# Patient Record
Sex: FEMALE | Race: WHITE | NOT HISPANIC OR LATINO | Employment: FULL TIME | URBAN - NONMETROPOLITAN AREA
[De-identification: names, ages, dates, MRNs, and addresses within clinical notes are randomized per-mention and may not be internally consistent; named-entity substitution may affect disease eponyms.]

---

## 2020-02-04 ENCOUNTER — OFFICE VISIT (OUTPATIENT)
Dept: GASTROENTEROLOGY | Facility: CLINIC | Age: 50
End: 2020-02-04

## 2020-02-04 VITALS
DIASTOLIC BLOOD PRESSURE: 70 MMHG | SYSTOLIC BLOOD PRESSURE: 120 MMHG | WEIGHT: 147 LBS | OXYGEN SATURATION: 99 % | HEART RATE: 79 BPM | TEMPERATURE: 97.3 F

## 2020-02-04 DIAGNOSIS — K62.5 RECTAL BLEED: Primary | ICD-10-CM

## 2020-02-04 DIAGNOSIS — K21.9 GASTROESOPHAGEAL REFLUX DISEASE, ESOPHAGITIS PRESENCE NOT SPECIFIED: ICD-10-CM

## 2020-02-04 PROCEDURE — 99204 OFFICE O/P NEW MOD 45 MIN: CPT | Performed by: NURSE PRACTITIONER

## 2020-02-04 RX ORDER — ALPRAZOLAM 0.25 MG/1
TABLET ORAL
COMMUNITY
Start: 2020-02-03

## 2020-02-04 RX ORDER — RIZATRIPTAN BENZOATE 10 MG/1
TABLET, ORALLY DISINTEGRATING ORAL
COMMUNITY
Start: 2019-11-20

## 2020-02-04 RX ORDER — FLUCONAZOLE 150 MG/1
150 TABLET ORAL DAILY
Status: ON HOLD | COMMUNITY
Start: 2020-01-29 | End: 2020-02-14

## 2020-02-04 RX ORDER — SUCRALFATE 1 G/1
TABLET ORAL
COMMUNITY
Start: 2019-12-05

## 2020-02-04 RX ORDER — ERENUMAB-AOOE 70 MG/ML
INJECTION SUBCUTANEOUS
COMMUNITY
Start: 2020-01-22

## 2020-02-04 RX ORDER — CYANOCOBALAMIN 1000 UG/ML
INJECTION, SOLUTION INTRAMUSCULAR; SUBCUTANEOUS
COMMUNITY
Start: 2020-01-05

## 2020-02-04 RX ORDER — BUTALBITAL, ACETAMINOPHEN AND CAFFEINE 300; 40; 50 MG/1; MG/1; MG/1
CAPSULE ORAL
COMMUNITY
Start: 2020-02-03

## 2020-02-04 RX ORDER — NYSTATIN 100000 U/G
CREAM TOPICAL 2 TIMES DAILY
Status: ON HOLD | COMMUNITY
Start: 2019-11-01 | End: 2020-02-14

## 2020-02-04 NOTE — PROGRESS NOTES
"Chief Complaint   Patient presents with   • GI Problem     throwing up burning in throat gastritis per dr. mercy cope        PCP: Jean Crocker MD  REFER: Jean Crocker MD    Subjective     HPI    Patient presents to office with complaints of  Complain of \"naggin\" upper abdominal pain with radiation to LUQ.  Increased difficulty with indigestion over past month.  She has developed foul taste in her mouth.  She frequently has sore throat.  Recurrent nausea with emesis.  She has taken OTC Nexium since 3/2018.  No dysphagia.     Intermittent Rectal pain.  feces passing through rectum exacerbates pain.  Bowels were moving regular, soft daily.  now stool is hard, large, sometimes observes bright red blood, greasy film in toilet bowel after BM.  Change in bowel habit started after episode of severe constipation  4 weeks ago.  Constipation started after using gaviscon.    CScope (Dr Wu) 8/2015-rectal polyp   Endoscopy (Dr Wu) 2015-normal     History reviewed. No pertinent past medical history.    Past Surgical History:   Procedure Laterality Date   • CHOLECYSTECTOMY     • COLONOSCOPY  08/17/2015    a small rectal polyp removed and obliterat   • ENDOSCOPY  08/17/2015    normal   • GASTRIC BANDING     • HYSTERECTOMY         Outpatient Medications Marked as Taking for the 2/4/20 encounter (Office Visit) with Adren Yo APRN   Medication Sig Dispense Refill   • AIMOVIG 70 MG/ML prefilled syringe AS DIRECTED INJECT 70MG ML SUBCUTANEOUS ONCE MONTHLY     • ALPRAZolam (XANAX) 0.25 MG tablet      • butalbital-acetaminophen-caffeine (ORBIVAN) -40 MG capsule capsule      • cyanocobalamin 1000 MCG/ML injection [id number] (1000MCG) AS DIRECTED ONCE A WEEK     • Esomeprazole Magnesium (NEXIUM 24HR PO) Take  by mouth.     • fluconazole (DIFLUCAN) 150 MG tablet Take 150 mg by mouth Daily.     • nystatin (MYCOSTATIN) 616391 UNIT/GM cream Apply  topically to the appropriate area as directed 2 " (Two) Times a Day.     • rizatriptan MLT (MAXALT-MLT) 10 MG disintegrating tablet TAKE 1 TABLET BY MOUTH AT ONSET OF MIGRAINE AND MAY REPEAT IN 2 HOURS AS NEEDED     • sucralfate (CARAFATE) 1 g tablet TAKE 1 TABLET BY MOUTH 4 TIMES DAILY BEFORE MEAL(S) AND AT BEDTIME         No Known Allergies    Social History     Socioeconomic History   • Marital status:      Spouse name: Not on file   • Number of children: Not on file   • Years of education: Not on file   • Highest education level: Not on file   Tobacco Use   • Smoking status: Never Smoker   • Smokeless tobacco: Never Used   Substance and Sexual Activity   • Alcohol use: Never     Frequency: Never   • Drug use: Never       Family History   Problem Relation Age of Onset   • Colon cancer Neg Hx    • Esophageal cancer Neg Hx        Review of Systems   Constitutional: Negative for fatigue, fever and unexpected weight change.   HENT: Negative for hearing loss, sore throat and voice change.    Eyes: Negative for visual disturbance.   Respiratory: Negative for cough, shortness of breath and wheezing.    Cardiovascular: Negative for chest pain and palpitations.   Gastrointestinal: Positive for anal bleeding, constipation, nausea and rectal pain. Negative for abdominal pain, blood in stool and vomiting.   Endocrine: Negative for polydipsia and polyuria.   Genitourinary: Negative for difficulty urinating, dysuria, hematuria and urgency.   Musculoskeletal: Negative for joint swelling and myalgias.   Skin: Negative for color change, rash and wound.   Neurological: Negative for dizziness, tremors, seizures and syncope.   Hematological: Does not bruise/bleed easily.   Psychiatric/Behavioral: Negative for agitation and confusion. The patient is not nervous/anxious.        Objective     Vitals:    02/04/20 1323   BP: 120/70   Pulse: 79   Temp: 97.3 °F (36.3 °C)   SpO2: 99%   Weight: 66.7 kg (147 lb)     There is no height or weight on file to calculate BMI.    Physical  Exam   Constitutional: She is oriented to person, place, and time. She appears well-developed and well-nourished. She is cooperative.   HENT:   Head: Normocephalic and atraumatic.   Eyes: Pupils are equal, round, and reactive to light. Conjunctivae are normal. No scleral icterus.   Neck: Normal range of motion. Neck supple. No JVD present. No thyroid mass and no thyromegaly present.   Cardiovascular: Normal rate, regular rhythm and normal heart sounds. Exam reveals no gallop and no friction rub.   No murmur heard.  Pulmonary/Chest: Effort normal and breath sounds normal. No accessory muscle usage. No respiratory distress. She has no wheezes. She has no rales.   Abdominal: Soft. Normal appearance and bowel sounds are normal. She exhibits no distension, no ascites and no mass. There is no hepatosplenomegaly. There is no tenderness. There is no rebound and no guarding.   Musculoskeletal: Normal range of motion. She exhibits no edema or tenderness.     Vascular Status -  Her right foot exhibits normal foot vasculature  and no edema. Her left foot exhibits normal foot vasculature  and no edema.  Lymphadenopathy:     She has no cervical adenopathy.   Neurological: She is alert and oriented to person, place, and time. She has normal strength. Gait normal.   Skin: Skin is warm, dry and intact. No rash noted.       Imaging Results (Most Recent)     None          There is no height or weight on file to calculate BMI.    Assessment/Plan     Rehan was seen today for gi problem.    Diagnoses and all orders for this visit:    Rectal bleed  -     Case Request; Standing  -     Case Request    Gastroesophageal reflux disease, esophagitis presence not specified  -     Case Request; Standing  -     Case Request        ESOPHAGOGASTRODUODENOSCOPY WITH ANESTHESIA (N/A)   2. COLONOSCOPY WITH ANESTHESIA    Take PPI 30 min prior to breakfast   Decrease caffeine, nicotine, etoh- all contribute to acid reflux  Do not eat 2-3 hours within  lying down, elevated HOB 4-6 inches    Recommend daily use of Miralax, adjust as needed    The risk of the endoscopy were discussed in detail.  We discussed the risk of perforation (one out of 1216-5626, riskier with dilation), bleeding (one out of 500), and the rare risks of infection, adverse reaction to anesthesia, respiratory failure, cardiac failure including MI and adverse reaction to medications, etc.  We discussed consequences that could occur if a risk were to develop such as the need for hospitalization, blood transfusion, surgical intervention, medications, pain and disability and death.  Alternatives include not doing anything, or pursuing an UGI series which only offers a diagnosis with potential less accuracy compared to egd.  The patient verbalizes understanding and agrees to proceed.      All risks, benefits, alternatives, and indications of colonoscopy procedure have been discussed with the patient. Risks to include perforation of the colon requiring possible surgery or colostomy, risk of bleeding from biopsies or removal of colon tissue, possibility of missing a colon polyp or cancer, or adverse drug reaction.  Benefits to include the diagnosis and management of disease of the colon and rectum. Alternatives to include barium enema, radiographic evaluation, lab testing or no intervention. Pt verbalizes understanding and agrees to proceed with procedure.        Patient's There is no height or weight on file to calculate BMI. BMI is above normal parameters. Recommendations include: no follow up.      There are no Patient Instructions on file for this visit.

## 2020-02-04 NOTE — H&P (VIEW-ONLY)
"Chief Complaint   Patient presents with   • GI Problem     throwing up burning in throat gastritis per dr. mercy cope        PCP: Jean Crocker MD  REFER: Jean Crocker MD    Subjective     HPI    Patient presents to office with complaints of  Complain of \"naggin\" upper abdominal pain with radiation to LUQ.  Increased difficulty with indigestion over past month.  She has developed foul taste in her mouth.  She frequently has sore throat.  Recurrent nausea with emesis.  She has taken OTC Nexium since 3/2018.  No dysphagia.     Intermittent Rectal pain.  feces passing through rectum exacerbates pain.  Bowels were moving regular, soft daily.  now stool is hard, large, sometimes observes bright red blood, greasy film in toilet bowel after BM.  Change in bowel habit started after episode of severe constipation  4 weeks ago.  Constipation started after using gaviscon.    CScope (Dr Wu) 8/2015-rectal polyp   Endoscopy (Dr Wu) 2015-normal     History reviewed. No pertinent past medical history.    Past Surgical History:   Procedure Laterality Date   • CHOLECYSTECTOMY     • COLONOSCOPY  08/17/2015    a small rectal polyp removed and obliterat   • ENDOSCOPY  08/17/2015    normal   • GASTRIC BANDING     • HYSTERECTOMY         Outpatient Medications Marked as Taking for the 2/4/20 encounter (Office Visit) with Arden Yo APRN   Medication Sig Dispense Refill   • AIMOVIG 70 MG/ML prefilled syringe AS DIRECTED INJECT 70MG ML SUBCUTANEOUS ONCE MONTHLY     • ALPRAZolam (XANAX) 0.25 MG tablet      • butalbital-acetaminophen-caffeine (ORBIVAN) -40 MG capsule capsule      • cyanocobalamin 1000 MCG/ML injection [id number] (1000MCG) AS DIRECTED ONCE A WEEK     • Esomeprazole Magnesium (NEXIUM 24HR PO) Take  by mouth.     • fluconazole (DIFLUCAN) 150 MG tablet Take 150 mg by mouth Daily.     • nystatin (MYCOSTATIN) 341313 UNIT/GM cream Apply  topically to the appropriate area as directed 2 " (Two) Times a Day.     • rizatriptan MLT (MAXALT-MLT) 10 MG disintegrating tablet TAKE 1 TABLET BY MOUTH AT ONSET OF MIGRAINE AND MAY REPEAT IN 2 HOURS AS NEEDED     • sucralfate (CARAFATE) 1 g tablet TAKE 1 TABLET BY MOUTH 4 TIMES DAILY BEFORE MEAL(S) AND AT BEDTIME         No Known Allergies    Social History     Socioeconomic History   • Marital status:      Spouse name: Not on file   • Number of children: Not on file   • Years of education: Not on file   • Highest education level: Not on file   Tobacco Use   • Smoking status: Never Smoker   • Smokeless tobacco: Never Used   Substance and Sexual Activity   • Alcohol use: Never     Frequency: Never   • Drug use: Never       Family History   Problem Relation Age of Onset   • Colon cancer Neg Hx    • Esophageal cancer Neg Hx        Review of Systems   Constitutional: Negative for fatigue, fever and unexpected weight change.   HENT: Negative for hearing loss, sore throat and voice change.    Eyes: Negative for visual disturbance.   Respiratory: Negative for cough, shortness of breath and wheezing.    Cardiovascular: Negative for chest pain and palpitations.   Gastrointestinal: Positive for anal bleeding, constipation, nausea and rectal pain. Negative for abdominal pain, blood in stool and vomiting.   Endocrine: Negative for polydipsia and polyuria.   Genitourinary: Negative for difficulty urinating, dysuria, hematuria and urgency.   Musculoskeletal: Negative for joint swelling and myalgias.   Skin: Negative for color change, rash and wound.   Neurological: Negative for dizziness, tremors, seizures and syncope.   Hematological: Does not bruise/bleed easily.   Psychiatric/Behavioral: Negative for agitation and confusion. The patient is not nervous/anxious.        Objective     Vitals:    02/04/20 1323   BP: 120/70   Pulse: 79   Temp: 97.3 °F (36.3 °C)   SpO2: 99%   Weight: 66.7 kg (147 lb)     There is no height or weight on file to calculate BMI.    Physical  Exam   Constitutional: She is oriented to person, place, and time. She appears well-developed and well-nourished. She is cooperative.   HENT:   Head: Normocephalic and atraumatic.   Eyes: Pupils are equal, round, and reactive to light. Conjunctivae are normal. No scleral icterus.   Neck: Normal range of motion. Neck supple. No JVD present. No thyroid mass and no thyromegaly present.   Cardiovascular: Normal rate, regular rhythm and normal heart sounds. Exam reveals no gallop and no friction rub.   No murmur heard.  Pulmonary/Chest: Effort normal and breath sounds normal. No accessory muscle usage. No respiratory distress. She has no wheezes. She has no rales.   Abdominal: Soft. Normal appearance and bowel sounds are normal. She exhibits no distension, no ascites and no mass. There is no hepatosplenomegaly. There is no tenderness. There is no rebound and no guarding.   Musculoskeletal: Normal range of motion. She exhibits no edema or tenderness.     Vascular Status -  Her right foot exhibits normal foot vasculature  and no edema. Her left foot exhibits normal foot vasculature  and no edema.  Lymphadenopathy:     She has no cervical adenopathy.   Neurological: She is alert and oriented to person, place, and time. She has normal strength. Gait normal.   Skin: Skin is warm, dry and intact. No rash noted.       Imaging Results (Most Recent)     None          There is no height or weight on file to calculate BMI.    Assessment/Plan     Rehan was seen today for gi problem.    Diagnoses and all orders for this visit:    Rectal bleed  -     Case Request; Standing  -     Case Request    Gastroesophageal reflux disease, esophagitis presence not specified  -     Case Request; Standing  -     Case Request        ESOPHAGOGASTRODUODENOSCOPY WITH ANESTHESIA (N/A)   2. COLONOSCOPY WITH ANESTHESIA    Take PPI 30 min prior to breakfast   Decrease caffeine, nicotine, etoh- all contribute to acid reflux  Do not eat 2-3 hours within  lying down, elevated HOB 4-6 inches    Recommend daily use of Miralax, adjust as needed    The risk of the endoscopy were discussed in detail.  We discussed the risk of perforation (one out of 8020-6549, riskier with dilation), bleeding (one out of 500), and the rare risks of infection, adverse reaction to anesthesia, respiratory failure, cardiac failure including MI and adverse reaction to medications, etc.  We discussed consequences that could occur if a risk were to develop such as the need for hospitalization, blood transfusion, surgical intervention, medications, pain and disability and death.  Alternatives include not doing anything, or pursuing an UGI series which only offers a diagnosis with potential less accuracy compared to egd.  The patient verbalizes understanding and agrees to proceed.      All risks, benefits, alternatives, and indications of colonoscopy procedure have been discussed with the patient. Risks to include perforation of the colon requiring possible surgery or colostomy, risk of bleeding from biopsies or removal of colon tissue, possibility of missing a colon polyp or cancer, or adverse drug reaction.  Benefits to include the diagnosis and management of disease of the colon and rectum. Alternatives to include barium enema, radiographic evaluation, lab testing or no intervention. Pt verbalizes understanding and agrees to proceed with procedure.        Patient's There is no height or weight on file to calculate BMI. BMI is above normal parameters. Recommendations include: no follow up.      There are no Patient Instructions on file for this visit.

## 2020-02-04 NOTE — H&P (VIEW-ONLY)
"Chief Complaint   Patient presents with   • GI Problem     throwing up burning in throat gastritis per dr. mercy cope        PCP: Jean Crocker MD  REFER: Jean Crocker MD    Subjective     HPI    Patient presents to office with complaints of  Complain of \"naggin\" upper abdominal pain with radiation to LUQ.  Increased difficulty with indigestion over past month.  She has developed foul taste in her mouth.  She frequently has sore throat.  Recurrent nausea with emesis.  She has taken OTC Nexium since 3/2018.  No dysphagia.     Intermittent Rectal pain.  feces passing through rectum exacerbates pain.  Bowels were moving regular, soft daily.  now stool is hard, large, sometimes observes bright red blood, greasy film in toilet bowel after BM.  Change in bowel habit started after episode of severe constipation  4 weeks ago.  Constipation started after using gaviscon.    CScope (Dr Wu) 8/2015-rectal polyp   Endoscopy (Dr Wu) 2015-normal     History reviewed. No pertinent past medical history.    Past Surgical History:   Procedure Laterality Date   • CHOLECYSTECTOMY     • COLONOSCOPY  08/17/2015    a small rectal polyp removed and obliterat   • ENDOSCOPY  08/17/2015    normal   • GASTRIC BANDING     • HYSTERECTOMY         Outpatient Medications Marked as Taking for the 2/4/20 encounter (Office Visit) with Arden Yo APRN   Medication Sig Dispense Refill   • AIMOVIG 70 MG/ML prefilled syringe AS DIRECTED INJECT 70MG ML SUBCUTANEOUS ONCE MONTHLY     • ALPRAZolam (XANAX) 0.25 MG tablet      • butalbital-acetaminophen-caffeine (ORBIVAN) -40 MG capsule capsule      • cyanocobalamin 1000 MCG/ML injection [id number] (1000MCG) AS DIRECTED ONCE A WEEK     • Esomeprazole Magnesium (NEXIUM 24HR PO) Take  by mouth.     • fluconazole (DIFLUCAN) 150 MG tablet Take 150 mg by mouth Daily.     • nystatin (MYCOSTATIN) 421234 UNIT/GM cream Apply  topically to the appropriate area as directed 2 " (Two) Times a Day.     • rizatriptan MLT (MAXALT-MLT) 10 MG disintegrating tablet TAKE 1 TABLET BY MOUTH AT ONSET OF MIGRAINE AND MAY REPEAT IN 2 HOURS AS NEEDED     • sucralfate (CARAFATE) 1 g tablet TAKE 1 TABLET BY MOUTH 4 TIMES DAILY BEFORE MEAL(S) AND AT BEDTIME         No Known Allergies    Social History     Socioeconomic History   • Marital status:      Spouse name: Not on file   • Number of children: Not on file   • Years of education: Not on file   • Highest education level: Not on file   Tobacco Use   • Smoking status: Never Smoker   • Smokeless tobacco: Never Used   Substance and Sexual Activity   • Alcohol use: Never     Frequency: Never   • Drug use: Never       Family History   Problem Relation Age of Onset   • Colon cancer Neg Hx    • Esophageal cancer Neg Hx        Review of Systems   Constitutional: Negative for fatigue, fever and unexpected weight change.   HENT: Negative for hearing loss, sore throat and voice change.    Eyes: Negative for visual disturbance.   Respiratory: Negative for cough, shortness of breath and wheezing.    Cardiovascular: Negative for chest pain and palpitations.   Gastrointestinal: Positive for anal bleeding, constipation, nausea and rectal pain. Negative for abdominal pain, blood in stool and vomiting.   Endocrine: Negative for polydipsia and polyuria.   Genitourinary: Negative for difficulty urinating, dysuria, hematuria and urgency.   Musculoskeletal: Negative for joint swelling and myalgias.   Skin: Negative for color change, rash and wound.   Neurological: Negative for dizziness, tremors, seizures and syncope.   Hematological: Does not bruise/bleed easily.   Psychiatric/Behavioral: Negative for agitation and confusion. The patient is not nervous/anxious.        Objective     Vitals:    02/04/20 1323   BP: 120/70   Pulse: 79   Temp: 97.3 °F (36.3 °C)   SpO2: 99%   Weight: 66.7 kg (147 lb)     There is no height or weight on file to calculate BMI.    Physical  Exam   Constitutional: She is oriented to person, place, and time. She appears well-developed and well-nourished. She is cooperative.   HENT:   Head: Normocephalic and atraumatic.   Eyes: Pupils are equal, round, and reactive to light. Conjunctivae are normal. No scleral icterus.   Neck: Normal range of motion. Neck supple. No JVD present. No thyroid mass and no thyromegaly present.   Cardiovascular: Normal rate, regular rhythm and normal heart sounds. Exam reveals no gallop and no friction rub.   No murmur heard.  Pulmonary/Chest: Effort normal and breath sounds normal. No accessory muscle usage. No respiratory distress. She has no wheezes. She has no rales.   Abdominal: Soft. Normal appearance and bowel sounds are normal. She exhibits no distension, no ascites and no mass. There is no hepatosplenomegaly. There is no tenderness. There is no rebound and no guarding.   Musculoskeletal: Normal range of motion. She exhibits no edema or tenderness.     Vascular Status -  Her right foot exhibits normal foot vasculature  and no edema. Her left foot exhibits normal foot vasculature  and no edema.  Lymphadenopathy:     She has no cervical adenopathy.   Neurological: She is alert and oriented to person, place, and time. She has normal strength. Gait normal.   Skin: Skin is warm, dry and intact. No rash noted.       Imaging Results (Most Recent)     None          There is no height or weight on file to calculate BMI.    Assessment/Plan     Rehan was seen today for gi problem.    Diagnoses and all orders for this visit:    Rectal bleed  -     Case Request; Standing  -     Case Request    Gastroesophageal reflux disease, esophagitis presence not specified  -     Case Request; Standing  -     Case Request        ESOPHAGOGASTRODUODENOSCOPY WITH ANESTHESIA (N/A)   2. COLONOSCOPY WITH ANESTHESIA    Take PPI 30 min prior to breakfast   Decrease caffeine, nicotine, etoh- all contribute to acid reflux  Do not eat 2-3 hours within  lying down, elevated HOB 4-6 inches    Recommend daily use of Miralax, adjust as needed    The risk of the endoscopy were discussed in detail.  We discussed the risk of perforation (one out of 5634-2177, riskier with dilation), bleeding (one out of 500), and the rare risks of infection, adverse reaction to anesthesia, respiratory failure, cardiac failure including MI and adverse reaction to medications, etc.  We discussed consequences that could occur if a risk were to develop such as the need for hospitalization, blood transfusion, surgical intervention, medications, pain and disability and death.  Alternatives include not doing anything, or pursuing an UGI series which only offers a diagnosis with potential less accuracy compared to egd.  The patient verbalizes understanding and agrees to proceed.      All risks, benefits, alternatives, and indications of colonoscopy procedure have been discussed with the patient. Risks to include perforation of the colon requiring possible surgery or colostomy, risk of bleeding from biopsies or removal of colon tissue, possibility of missing a colon polyp or cancer, or adverse drug reaction.  Benefits to include the diagnosis and management of disease of the colon and rectum. Alternatives to include barium enema, radiographic evaluation, lab testing or no intervention. Pt verbalizes understanding and agrees to proceed with procedure.        Patient's There is no height or weight on file to calculate BMI. BMI is above normal parameters. Recommendations include: no follow up.      There are no Patient Instructions on file for this visit.

## 2020-02-14 ENCOUNTER — TELEPHONE (OUTPATIENT)
Dept: GASTROENTEROLOGY | Facility: CLINIC | Age: 50
End: 2020-02-14

## 2020-02-14 ENCOUNTER — ANESTHESIA (OUTPATIENT)
Dept: GASTROENTEROLOGY | Facility: HOSPITAL | Age: 50
End: 2020-02-14

## 2020-02-14 ENCOUNTER — ANESTHESIA EVENT (OUTPATIENT)
Dept: GASTROENTEROLOGY | Facility: HOSPITAL | Age: 50
End: 2020-02-14

## 2020-02-14 ENCOUNTER — HOSPITAL ENCOUNTER (OUTPATIENT)
Facility: HOSPITAL | Age: 50
Setting detail: HOSPITAL OUTPATIENT SURGERY
Discharge: HOME | End: 2020-02-14
Attending: INTERNAL MEDICINE | Admitting: INTERNAL MEDICINE

## 2020-02-14 VITALS
TEMPERATURE: 97.9 F | RESPIRATION RATE: 14 BRPM | BODY MASS INDEX: 20.09 KG/M2 | HEIGHT: 67 IN | WEIGHT: 128 LBS | OXYGEN SATURATION: 100 % | HEART RATE: 75 BPM | DIASTOLIC BLOOD PRESSURE: 88 MMHG | SYSTOLIC BLOOD PRESSURE: 117 MMHG

## 2020-02-14 DIAGNOSIS — K62.5 RECTAL BLEED: ICD-10-CM

## 2020-02-14 PROCEDURE — 45385 COLONOSCOPY W/LESION REMOVAL: CPT | Performed by: INTERNAL MEDICINE

## 2020-02-14 PROCEDURE — 25010000002 PROPOFOL 10 MG/ML EMULSION: Performed by: NURSE ANESTHETIST, CERTIFIED REGISTERED

## 2020-02-14 RX ORDER — PROPOFOL 10 MG/ML
VIAL (ML) INTRAVENOUS AS NEEDED
Status: DISCONTINUED | OUTPATIENT
Start: 2020-02-14 | End: 2020-02-14 | Stop reason: SURG

## 2020-02-14 RX ORDER — SODIUM CHLORIDE 0.9 % (FLUSH) 0.9 %
10 SYRINGE (ML) INJECTION AS NEEDED
Status: DISCONTINUED | OUTPATIENT
Start: 2020-02-14 | End: 2020-02-14 | Stop reason: HOSPADM

## 2020-02-14 RX ORDER — SODIUM CHLORIDE 9 MG/ML
500 INJECTION, SOLUTION INTRAVENOUS CONTINUOUS PRN
Status: DISCONTINUED | OUTPATIENT
Start: 2020-02-14 | End: 2020-02-14 | Stop reason: HOSPADM

## 2020-02-14 RX ADMIN — PROPOFOL 230 MG: 10 INJECTION, EMULSION INTRAVENOUS at 08:06

## 2020-02-14 RX ADMIN — SODIUM CHLORIDE 500 ML: 9 INJECTION, SOLUTION INTRAVENOUS at 07:58

## 2020-02-14 NOTE — ANESTHESIA PREPROCEDURE EVALUATION
Anesthesia Evaluation     Patient summary reviewed   history of anesthetic complications: PONV  NPO Solid Status: > 8 hours  NPO Liquid Status: > 4 hours           Airway   Mallampati: I  TM distance: >3 FB  Neck ROM: full  No difficulty expected  Dental - normal exam     Pulmonary - negative pulmonary ROS   (-) asthma, shortness of breath, not a smoker  Cardiovascular - negative cardio ROS  Exercise tolerance: excellent (>7 METS)    (-) hypertension, past MI, CAD, cardiac stents, hyperlipidemia      Neuro/Psych- negative ROS  (-) seizures, TIA  GI/Hepatic/Renal/Endo    (+)  GERD,    (-) liver disease, no renal disease, diabetes    ROS Comment: S/p gastric band with revision    Musculoskeletal     Abdominal    Substance History      OB/GYN          Other                        Anesthesia Plan    ASA 2     MAC     intravenous induction     Anesthetic plan, all risks, benefits, and alternatives have been provided, discussed and informed consent has been obtained with: patient.

## 2020-02-14 NOTE — ANESTHESIA POSTPROCEDURE EVALUATION
Patient: Rehan De León    Procedure Summary     Date:  02/14/20 Room / Location:  [facility] ENDOSCOPY 6 / [facility] ENDOSCOPY    Anesthesia Start:  0803 Anesthesia Stop:  0821    Procedure:  COLONOSCOPY WITH ANESTHESIA (N/A ) Diagnosis:       Rectal bleed      (Rectal bleed [K62.5])    Surgeon:  Jerad Wu DO Provider:  Edward Castillo CRNA    Anesthesia Type:  MAC ASA Status:  2          Anesthesia Type: MAC    Vitals  No vitals data found for the desired time range.          Post Anesthesia Care and Evaluation    Patient location during evaluation: PHASE II  Patient participation: complete - patient participated  Level of consciousness: awake and alert  Pain management: adequate  Airway patency: patent  Anesthetic complications: No anesthetic complications    Cardiovascular status: acceptable  Respiratory status: acceptable  Hydration status: acceptable

## 2020-02-17 ENCOUNTER — ANESTHESIA EVENT (OUTPATIENT)
Dept: GASTROENTEROLOGY | Facility: HOSPITAL | Age: 50
End: 2020-02-17

## 2020-02-17 ENCOUNTER — HOSPITAL ENCOUNTER (OUTPATIENT)
Facility: HOSPITAL | Age: 50
Setting detail: HOSPITAL OUTPATIENT SURGERY
Discharge: HOME | End: 2020-02-17
Attending: INTERNAL MEDICINE | Admitting: INTERNAL MEDICINE

## 2020-02-17 ENCOUNTER — ANESTHESIA (OUTPATIENT)
Dept: GASTROENTEROLOGY | Facility: HOSPITAL | Age: 50
End: 2020-02-17

## 2020-02-17 VITALS
HEART RATE: 63 BPM | TEMPERATURE: 98 F | WEIGHT: 133 LBS | SYSTOLIC BLOOD PRESSURE: 118 MMHG | DIASTOLIC BLOOD PRESSURE: 84 MMHG | HEIGHT: 67 IN | OXYGEN SATURATION: 100 % | BODY MASS INDEX: 20.88 KG/M2 | RESPIRATION RATE: 15 BRPM

## 2020-02-17 DIAGNOSIS — K21.9 GASTROESOPHAGEAL REFLUX DISEASE, ESOPHAGITIS PRESENCE NOT SPECIFIED: ICD-10-CM

## 2020-02-17 PROCEDURE — 43235 EGD DIAGNOSTIC BRUSH WASH: CPT | Performed by: INTERNAL MEDICINE

## 2020-02-17 PROCEDURE — 25010000002 PROPOFOL 10 MG/ML EMULSION: Performed by: NURSE ANESTHETIST, CERTIFIED REGISTERED

## 2020-02-17 RX ORDER — PROPOFOL 10 MG/ML
VIAL (ML) INTRAVENOUS AS NEEDED
Status: DISCONTINUED | OUTPATIENT
Start: 2020-02-17 | End: 2020-02-17 | Stop reason: SURG

## 2020-02-17 RX ORDER — LIDOCAINE HYDROCHLORIDE 10 MG/ML
0.5 INJECTION, SOLUTION EPIDURAL; INFILTRATION; INTRACAUDAL; PERINEURAL ONCE AS NEEDED
Status: DISCONTINUED | OUTPATIENT
Start: 2020-02-17 | End: 2020-02-17 | Stop reason: HOSPADM

## 2020-02-17 RX ORDER — SODIUM CHLORIDE 9 MG/ML
500 INJECTION, SOLUTION INTRAVENOUS CONTINUOUS PRN
Status: DISCONTINUED | OUTPATIENT
Start: 2020-02-17 | End: 2020-02-17 | Stop reason: HOSPADM

## 2020-02-17 RX ORDER — SODIUM CHLORIDE 0.9 % (FLUSH) 0.9 %
10 SYRINGE (ML) INJECTION AS NEEDED
Status: DISCONTINUED | OUTPATIENT
Start: 2020-02-17 | End: 2020-02-17 | Stop reason: HOSPADM

## 2020-02-17 RX ADMIN — LIDOCAINE HYDROCHLORIDE 100 MG: 20 INJECTION, SOLUTION INTRAVENOUS at 08:36

## 2020-02-17 RX ADMIN — SODIUM CHLORIDE 500 ML: 9 INJECTION, SOLUTION INTRAVENOUS at 07:33

## 2020-02-17 RX ADMIN — LIDOCAINE HYDROCHLORIDE 40 MG: 20 INJECTION, SOLUTION INTRAVENOUS at 08:37

## 2020-02-17 RX ADMIN — PROPOFOL 50 MG: 10 INJECTION, EMULSION INTRAVENOUS at 08:37

## 2020-02-17 RX ADMIN — PROPOFOL 70 MG: 10 INJECTION, EMULSION INTRAVENOUS at 08:36

## 2020-02-17 NOTE — ANESTHESIA POSTPROCEDURE EVALUATION
Patient: Rehan De León    Procedure Summary     Date:  02/17/20 Room / Location:  [facility] ENDOSCOPY 5 / [facility]OS[facility]    Anesthesia Start:  0828 Anesthesia Stop:  0843    Procedure:  ESOPHAGOGASTRODUODENOSCOPY WITH ANESTHESIA (N/A ) Diagnosis:       Gastroesophageal reflux disease, esophagitis presence not specified      (Gastroesophageal reflux disease, esophagitis presence not specified [K21.9])    Surgeon:  Jerad Wu DO Provider:  Faby De Leon CRNA    Anesthesia Type:  MAC ASA Status:  2          Anesthesia Type: MAC    Vitals  Vitals Value Taken Time   /88 2/17/2020  9:00 AM   Temp     Pulse 50 2/17/2020  9:00 AM   Resp 15 2/17/2020  8:50 AM   SpO2 100 % 2/17/2020  9:00 AM   Vitals shown include unvalidated device data.        Post Anesthesia Care and Evaluation    Patient location during evaluation: PHASE II  Patient participation: complete - patient participated  Level of consciousness: awake and alert  Pain management: adequate  Airway patency: patent  Anesthetic complications: No anesthetic complications  PONV Status: none  Cardiovascular status: acceptable  Respiratory status: acceptable  Hydration status: acceptable

## 2020-02-18 ENCOUNTER — TELEPHONE (OUTPATIENT)
Dept: GASTROENTEROLOGY | Facility: CLINIC | Age: 50
End: 2020-02-18

## 2020-02-18 NOTE — TELEPHONE ENCOUNTER
Naima from [facility] office called wanting to know if patient is needing EUS ? If so who do we recommend? And what is the DX code ?    Phone: [phone]  Fax: [phone]

## 2020-02-18 NOTE — TELEPHONE ENCOUNTER
Patient wished to discuss with her new bariatric surgeon   She has appointment 2/19 (Wednesday)    We will place referral if one is warranted    Thank you

## 2020-02-20 ENCOUNTER — TELEPHONE (OUTPATIENT)
Dept: GASTROENTEROLOGY | Facility: CLINIC | Age: 50
End: 2020-02-20

## 2020-02-20 NOTE — TELEPHONE ENCOUNTER
----- Message from Rehan De León sent at 2/20/2020 10:21 AM CST -----  Regarding: Referral Request  Contact: [phone]  IM AM TRYING TO GET THIS REFERAL FOR THE EOS PREFERABLY IN [facility]. I DID CONSULT MY BARIATRIC PA AND SHE AGREED IM GETTING CONFLICTING INPUT FROM MY FAMILY DR POND WHEN THEY CALLED ME TUESDAY THEY SAID YES I NEEDED THE EOS AND NOW TODAY THEY WERENT SURE. AFTER MY EDG MONDAY DR CRABTREE SAID I NEEDED THE EOS TO SEE HOW DEEP THE ROOTS OF THE POLYP WERE AND THAT HE WOULDNT DO IT NEEDED A SPECIALIST THAT DOES EOS AND THAT IT DID  NEED TO BE REMOVED AND THAT THEY HAVE A HIGH RISK OF BLEEDING SO NEEDED TO BE REFERRED TO [facility] OR [facility].

## 2020-02-21 ENCOUNTER — TELEPHONE (OUTPATIENT)
Dept: GASTROENTEROLOGY | Facility: CLINIC | Age: 50
End: 2020-02-21

## 2020-02-21 NOTE — TELEPHONE ENCOUNTER
Can you please see if Dr Shane Sherwood at [place] in [facility] performs EUS    Thank you      [phone]

## 2020-02-21 NOTE — TELEPHONE ENCOUNTER
Called Dr. Sherwood office [phone] Fax#[phone].     The person that answered the phone didn't know what I was asking/ talking about so she transferred me to his nurse. Had to leave a message - gave her the [phone] back line - Needing to know how to referr pt and who preforming EUS procedures.    Thank you

## 2020-02-21 NOTE — TELEPHONE ENCOUNTER
NO - Dr. Sehrwood does not.     Doctors that do EUS procedures- Dr. Benedict, Dr. Quinn, Dr. Kearney, Dr. De La Cruz

## 2020-02-22 DIAGNOSIS — K31.7 GASTRIC POLYP: Primary | ICD-10-CM

## 2020-02-27 NOTE — TELEPHONE ENCOUNTER
Spoke with office today - They give me a secondary fax number to send records. Will follow up on 02/28/2020

## 2023-10-12 ENCOUNTER — OFFICE VISIT (OUTPATIENT)
Dept: GASTROENTEROLOGY | Facility: CLINIC | Age: 53
End: 2023-10-12
Payer: COMMERCIAL

## 2023-10-12 VITALS
TEMPERATURE: 98.4 F | BODY MASS INDEX: 22.99 KG/M2 | WEIGHT: 146.5 LBS | SYSTOLIC BLOOD PRESSURE: 118 MMHG | HEART RATE: 70 BPM | DIASTOLIC BLOOD PRESSURE: 80 MMHG | OXYGEN SATURATION: 99 % | HEIGHT: 67 IN

## 2023-10-12 DIAGNOSIS — Z86.010 HISTORY OF COLON POLYPS: ICD-10-CM

## 2023-10-12 DIAGNOSIS — Z80.0 FAMILY HISTORY OF COLON CANCER: Primary | ICD-10-CM

## 2023-10-12 NOTE — PROGRESS NOTES
"Chief Complaint   Patient presents with    Colonoscopy     History of polyps.        PCP: Hannah Crocker PA  REFER: Hannah Crocker, *    Subjective     HPI    Rehan De León is a 53 y.o. female who presents to office for preventative maintenance.  There is  a personal history of colon polyps.   She does not have complaints of nausea/vomiting, change in bowels, weight loss, no BRBPR, no melena.  There is (mother) a family history of colon cancer in first degree relative.  There is not a family history of colon polyps in first degree relative.  Rehan De León last colonoscopy-2020 .  Bowels do move on regular basis.     COLONOSCOPY (02/14/2020 08:04) -polypectomy - 5 years   SCANNED - COLONOSCOPY (08/17/2015 00:00)     UPPER GI ENDOSCOPY (02/17/2020 08:29)   ENDOSCOPY, INT (08/17/2015 00:00)     No results for input(s): \"GLUCOSE\", \"NA\", \"K\", \"CREATININE\", \"BUN\", \"BCR\", \"ALKPHOS\", \"ALT\", \"AST\", \"BILITOT\", \"ALBUMIN\" in the last 43871 hours.    No results for input(s): \"EGFRIFNONA\", \"EGFRIFAFRI\", \"EGFRRESULT\" in the last 22402 hours.     Past Medical History:   Diagnosis Date    PONV (postoperative nausea and vomiting)      Past Surgical History:   Procedure Laterality Date    CHOLECYSTECTOMY      COLONOSCOPY  08/17/2015    a small rectal polyp removed and obliterat    COLONOSCOPY N/A 2/14/2020    Procedure: COLONOSCOPY WITH ANESTHESIA;  Surgeon: Jerad Wu DO;  Location: [facility] ENDOSCOPY;  Service: Gastroenterology;  Laterality: N/A;  pre: rectal bleed  post:normal  Jean Crocker MD    ENDOSCOPY  08/17/2015    normal    ENDOSCOPY N/A 2/17/2020    Procedure: ESOPHAGOGASTRODUODENOSCOPY WITH ANESTHESIA;  Surgeon: Jerad Wu DO;  Location: [facility] ENDOSCOPY;  Service: Gastroenterology;  Laterality: N/A;  preop; GERD  postop; polyp at GE junction   PCP Jean Crocker     GASTRIC BANDING      HYSTERECTOMY       Outpatient Medications Marked as Taking for the 10/12/23 encounter (Office Visit) " with Arden Yo APRN   Medication Sig Dispense Refill    AIMOVIG 70 MG/ML prefilled syringe AS DIRECTED INJECT 70MG ML SUBCUTANEOUS ONCE MONTHLY      cyanocobalamin 1000 MCG/ML injection [id number] (1000MCG) AS DIRECTED ONCE A WEEK       No Known Allergies  Social History     Socioeconomic History    Marital status:    Tobacco Use    Smoking status: Never    Smokeless tobacco: Never   Substance and Sexual Activity    Alcohol use: Never    Drug use: Never    Sexual activity: Defer     Review of Systems   Constitutional:  Negative for fever and unexpected weight change.   HENT:  Negative for trouble swallowing.    Respiratory:  Negative for shortness of breath.    Cardiovascular:  Negative for chest pain.   Gastrointestinal:  Negative for abdominal pain and anal bleeding.     Objective   Vitals:    10/12/23 1338   BP: 118/80   Pulse: 70   Temp: 98.4 °F (36.9 °C)   SpO2: 99%     Physical Exam  Constitutional:       Appearance: Normal appearance. She is well-developed.   Eyes:      General: No scleral icterus.  Cardiovascular:      Heart sounds: Normal heart sounds. No murmur heard.  Pulmonary:      Effort: Pulmonary effort is normal.   Abdominal:      General: Bowel sounds are normal. There is no distension.      Palpations: Abdomen is soft.      Tenderness: There is no abdominal tenderness. There is no guarding.   Skin:     General: Skin is warm and dry.      Coloration: Skin is not jaundiced.   Neurological:      Mental Status: She is alert.   Psychiatric:         Behavior: Behavior is cooperative.       Imaging Results (Most Recent)       None          Body mass index is 22.95 kg/m².    Assessment & Plan   Diagnoses and all orders for this visit:    1. Family history of colon cancer (Primary)    2. History of colon polyps      * Surgery not found *    Rehan De León is not exhibiting new GI related symptoms.  She is current on her colonoscopy (due 2025).  She does report fecal leakage.  I offered her  colonoscopy for further assessment of this but she did not wish to proceed as she reports fecal leakage was present at time of last colonoscopy and is not worse/different than it was at that time.  She will call office with new onset/worsening symptoms and we can consider and/or schedule colonoscopy accordingly at that time.  Rehan De León verbalized understanding     Arden Yo, APRN  10/12/23        There are no Patient Instructions on file for this visit.

## (undated) DEVICE — MASK,OXYGEN,MED CONC,ADLT,7' TUB, UC: Brand: PENDING

## (undated) DEVICE — YANKAUER,BULB TIP WITH VENT: Brand: ARGYLE

## (undated) DEVICE — Device: Brand: DEFENDO AIR/WATER/SUCTION AND BIOPSY VALVE

## (undated) DEVICE — CONMED SCOPE SAVER BITE BLOCK, 20X27 MM: Brand: SCOPE SAVER

## (undated) DEVICE — CUFF,BP,DISP,1 TUBE,ADULT,HP: Brand: MEDLINE

## (undated) DEVICE — SENSR O2 OXIMAX FNGR A/ 18IN NONSTR

## (undated) DEVICE — TBG SMPL FLTR LINE NASL 02/C02 A/ BX/100

## (undated) DEVICE — THE CHANNEL CLEANING BRUSH IS A NYLON FLEXI BRUSH ATTACHED TO A FLEXIBLE PLASTIC SHEATH DESIGNED TO SAFELY REMOVE DEBRIS FROM FLEXIBLE ENDOSCOPES.